# Patient Record
Sex: FEMALE | Race: WHITE | NOT HISPANIC OR LATINO | Employment: STUDENT | URBAN - METROPOLITAN AREA
[De-identification: names, ages, dates, MRNs, and addresses within clinical notes are randomized per-mention and may not be internally consistent; named-entity substitution may affect disease eponyms.]

---
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
## 2021-01-04 RX ORDER — ALBUTEROL SULFATE 90 UG/1
2 AEROSOL, METERED RESPIRATORY (INHALATION) EVERY 4 HOURS PRN
Qty: 3 INHALER | Refills: 1 | Status: SHIPPED | OUTPATIENT
Start: 2021-01-04 | End: 2021-10-13 | Stop reason: SDUPTHER

## 2021-01-04 RX ORDER — FLUTICASONE PROPIONATE 44 MCG
2 AEROSOL WITH ADAPTER (GRAM) INHALATION 2 TIMES DAILY
Qty: 3 INHALER | Refills: 1 | Status: SHIPPED | OUTPATIENT
Start: 2021-01-04 | End: 2021-10-13 | Stop reason: SDUPTHER

## 2021-01-05 DIAGNOSIS — E03.8 SUBCLINICAL HYPOTHYROIDISM: ICD-10-CM

## 2021-01-05 RX ORDER — LEVOTHYROXINE SODIUM 0.05 MG/1
50 TABLET ORAL
Qty: 90 TABLET | Refills: 1 | OUTPATIENT
Start: 2021-01-05

## 2021-01-08 LAB
ALBUMIN SERPL-MCNC: 3.9 G/DL (ref 3.6–5.1)
ALBUMIN/GLOB SERPL: 1.4 (CALC) (ref 1–2.5)
ALP SERPL-CCNC: 65 U/L (ref 36–128)
ALT SERPL-CCNC: 16 U/L (ref 5–32)
AST SERPL-CCNC: 15 U/L (ref 12–32)
BASOPHILS # BLD AUTO: 62 CELLS/UL (ref 0–200)
BASOPHILS NFR BLD AUTO: 0.7 %
BILIRUB SERPL-MCNC: 0.4 MG/DL (ref 0.2–1.1)
BUN SERPL-MCNC: 8 MG/DL (ref 7–20)
BUN/CREAT SERPL: NORMAL (CALC) (ref 6–22)
C TRACH RRNA SPEC QL NAA+PROBE: NOT DETECTED
CALCIUM SERPL-MCNC: 9.2 MG/DL (ref 8.9–10.4)
CHLORIDE SERPL-SCNC: 103 MMOL/L (ref 98–110)
CHOLEST SERPL-MCNC: 200 MG/DL
CHOLEST/HDLC SERPL: 3.6 (CALC)
CO2 SERPL-SCNC: 28 MMOL/L (ref 20–32)
CREAT SERPL-MCNC: 0.79 MG/DL (ref 0.5–1)
EOSINOPHIL # BLD AUTO: 123 CELLS/UL (ref 15–500)
EOSINOPHIL NFR BLD AUTO: 1.4 %
ERYTHROCYTE [DISTWIDTH] IN BLOOD BY AUTOMATED COUNT: 13 % (ref 11–15)
GLOBULIN SER CALC-MCNC: 2.8 G/DL (CALC) (ref 2–3.8)
GLUCOSE SERPL-MCNC: 92 MG/DL (ref 65–99)
HCT VFR BLD AUTO: 43.8 % (ref 35–45)
HDLC SERPL-MCNC: 55 MG/DL
HGB BLD-MCNC: 14.2 G/DL (ref 11.7–15.5)
HIV 1+2 AB+HIV1 P24 AG SERPL QL IA: NORMAL
LDLC SERPL CALC-MCNC: 118 MG/DL (CALC)
LYMPHOCYTES # BLD AUTO: 3494 CELLS/UL (ref 850–3900)
LYMPHOCYTES NFR BLD AUTO: 39.7 %
MCH RBC QN AUTO: 27 PG (ref 27–33)
MCHC RBC AUTO-ENTMCNC: 32.4 G/DL (ref 32–36)
MCV RBC AUTO: 83.4 FL (ref 80–100)
MONOCYTES # BLD AUTO: 519 CELLS/UL (ref 200–950)
MONOCYTES NFR BLD AUTO: 5.9 %
N GONORRHOEA RRNA SPEC QL NAA+PROBE: NOT DETECTED
NEUTROPHILS # BLD AUTO: 4602 CELLS/UL (ref 1500–7800)
NEUTROPHILS NFR BLD AUTO: 52.3 %
NONHDLC SERPL-MCNC: 145 MG/DL (CALC)
PLATELET # BLD AUTO: 318 THOUSAND/UL (ref 140–400)
PMV BLD REES-ECKER: 9.3 FL (ref 7.5–12.5)
POTASSIUM SERPL-SCNC: 4.1 MMOL/L (ref 3.8–5.1)
PROT SERPL-MCNC: 6.7 G/DL (ref 6.3–8.2)
RBC # BLD AUTO: 5.25 MILLION/UL (ref 3.8–5.1)
SL AMB EGFR AFRICAN AMERICAN: 126 ML/MIN/1.73M2
SL AMB EGFR NON AFRICAN AMERICAN: 109 ML/MIN/1.73M2
SODIUM SERPL-SCNC: 138 MMOL/L (ref 135–146)
T4 FREE SERPL-MCNC: 0.9 NG/DL (ref 0.8–1.4)
TRIGL SERPL-MCNC: 155 MG/DL
TSH SERPL-ACNC: 5.18 MIU/L
WBC # BLD AUTO: 8.8 THOUSAND/UL (ref 3.8–10.8)

## 2021-03-10 DIAGNOSIS — Z23 ENCOUNTER FOR IMMUNIZATION: ICD-10-CM

## 2021-03-11 ENCOUNTER — IMMUNIZATIONS (OUTPATIENT)
Dept: FAMILY MEDICINE CLINIC | Facility: HOSPITAL | Age: 21
End: 2021-03-11

## 2021-03-11 DIAGNOSIS — Z23 ENCOUNTER FOR IMMUNIZATION: Primary | ICD-10-CM

## 2021-03-11 PROCEDURE — 0001A SARS-COV-2 / COVID-19 MRNA VACCINE (PFIZER-BIONTECH) 30 MCG: CPT

## 2021-03-11 PROCEDURE — 91300 SARS-COV-2 / COVID-19 MRNA VACCINE (PFIZER-BIONTECH) 30 MCG: CPT

## 2021-03-22 ENCOUNTER — TELEMEDICINE (OUTPATIENT)
Dept: FAMILY MEDICINE CLINIC | Facility: CLINIC | Age: 21
End: 2021-03-22
Payer: COMMERCIAL

## 2021-03-22 VITALS — WEIGHT: 293 LBS | TEMPERATURE: 98.3 F | BODY MASS INDEX: 42.12 KG/M2

## 2021-03-22 DIAGNOSIS — F41.1 GENERALIZED ANXIETY DISORDER: ICD-10-CM

## 2021-03-22 DIAGNOSIS — F32.A DEPRESSION, UNSPECIFIED DEPRESSION TYPE: ICD-10-CM

## 2021-03-22 DIAGNOSIS — E03.9 ACQUIRED HYPOTHYROIDISM: Primary | ICD-10-CM

## 2021-03-22 PROBLEM — F43.10 PTSD (POST-TRAUMATIC STRESS DISORDER): Status: RESOLVED | Noted: 2020-11-15 | Resolved: 2021-03-22

## 2021-03-22 PROCEDURE — 3725F SCREEN DEPRESSION PERFORMED: CPT | Performed by: FAMILY MEDICINE

## 2021-03-22 PROCEDURE — 1036F TOBACCO NON-USER: CPT | Performed by: FAMILY MEDICINE

## 2021-03-22 PROCEDURE — 99214 OFFICE O/P EST MOD 30 MIN: CPT | Performed by: FAMILY MEDICINE

## 2021-03-22 PROCEDURE — 3008F BODY MASS INDEX DOCD: CPT | Performed by: FAMILY MEDICINE

## 2021-03-22 RX ORDER — LEVOTHYROXINE SODIUM 50 UG/1
50 TABLET ORAL DAILY
COMMUNITY
End: 2021-09-24 | Stop reason: DRUGHIGH

## 2021-03-22 RX ORDER — LEVOTHYROXINE SODIUM 0.05 MG/1
50 TABLET ORAL DAILY
COMMUNITY
Start: 2021-01-13 | End: 2021-03-22 | Stop reason: ALTCHOICE

## 2021-03-22 RX ORDER — FLUOXETINE HYDROCHLORIDE 40 MG/1
40 CAPSULE ORAL DAILY
Qty: 30 CAPSULE | Refills: 5 | Status: SHIPPED | OUTPATIENT
Start: 2021-03-22

## 2021-03-24 PROBLEM — E03.9 ACQUIRED HYPOTHYROIDISM: Status: ACTIVE | Noted: 2019-08-22

## 2021-03-24 PROBLEM — F32.A DEPRESSION: Status: ACTIVE | Noted: 2020-11-15

## 2021-03-24 NOTE — PROGRESS NOTES
Virtual Regular Visit      Assessment/Plan:    Problem List Items Addressed This Visit        Endocrine    Acquired hypothyroidism - Primary       She will continue on the current dose of her Levoxyl  She will follow-up with endocrinology as scheduled  Relevant Medications    Levoxyl 50 MCG tablet       Other    Depression       The patient is still having some breakthrough depression symptoms  We will increase the fluoxetine as ordered  We will continue to monitor closely  Relevant Medications    FLUoxetine (PROzac) 40 MG capsule    Generalized anxiety disorder       The patient's anxiety symptoms are improved with the fluoxetine but she feels it could be a little bit stronger  She is still having some breakthrough symptoms  We will increase the fluoxetine to 40 mg daily as ordered and continue to monitor her closely  Will have follow-up with her in 1 month for a virtual visit  Relevant Medications    FLUoxetine (PROzac) 40 MG capsule          BMI Counseling: Body mass index is 42 12 kg/m²  The BMI is above normal  Nutrition recommendations include decreasing portion sizes, encouraging healthy choices of fruits and vegetables, decreasing fast food intake, consuming healthier snacks, limiting drinks that contain sugar, moderation in carbohydrate intake, increasing intake of lean protein, reducing intake of saturated and trans fat and reducing intake of cholesterol  Exercise recommendations include exercising 3-5 times per week and strength training exercises  No pharmacotherapy was ordered  Patient referred to PCP due to patient being overweight             Reason for visit is   Chief Complaint   Patient presents with    Follow-up     Follow up Thyroid    Virtual Regular Visit        Encounter provider Tere Capellan DO    Provider located at [address] [address] [phone]      Recent Visits  Date Type Provider Dept   03/22/21 Telemedicine DO [facility]   Showing recent visits within past 7 days and meeting all other requirements     Future Appointments  No visits were found meeting these conditions  Showing future appointments within next 150 days and meeting all other requirements        The patient was identified by name and date of birth  Maddie Laird was informed that this is a telemedicine visit and that the visit is being conducted through [facility] and patient was informed that this is a secure, HIPAA-compliant platform  She agrees to proceed     My office door was closed  No one else was in the room  She acknowledged consent and understanding of privacy and security of the video platform  The patient has agreed to participate and understands they can discontinue the visit at any time  Patient is aware this is a billable service  Tanis Koyanagi is a 24 y o  female who presents for a follow up of her anxiety   Maddie Laird is a 24 y o  female  Who presents today for follow-up of her anxiety and hypothyroidism  She is currently seeing an endocrinologist at [facility]- Dr Opal Martinez  She was unable to tolerate levothyroxine due to her gluten and lactose sensitivity  We switched her to Levoxyl and she is doing well on this since it is gluten free  She states the fluoxetine has been helping with her anxiety but she is still feeling depressed and is sleeping more  She has decreased motivation and feeling blah  She feels very danielle as well  She denies any severe panic attacks  She has been stress eating more  She denies any suicidal ideations  She is interested in increasing the dose of her medication  Anxiety  Presents for follow-up visit  Symptoms include depressed mood, excessive worry and nervous/anxious behavior   Patient reports no chest pain, compulsions, confusion, decreased concentration, dizziness, dry mouth, feeling of choking, hyperventilation, impotence, insomnia, irritability, malaise, muscle tension, nausea, obsessions, palpitations, panic, restlessness, shortness of breath or suicidal ideas  Past Medical History:   Diagnosis Date    Asthma        Past Surgical History:   Procedure Laterality Date    INSERTION OF INTRAUTERINE DEVICE (IUD)      WISDOM TOOTH EXTRACTION      4       Current Outpatient Medications   Medication Sig Dispense Refill    albuterol (ProAir HFA) 90 mcg/act inhaler Inhale 2 puffs every 4 (four) hours as needed for wheezing 3 Inhaler 1    FLUoxetine (PROzac) 40 MG capsule Take 1 capsule (40 mg total) by mouth daily 30 capsule 5    fluticasone (Flovent HFA) 44 mcg/act inhaler Inhale 2 puffs 2 (two) times a day 3 Inhaler 1    KYLEENA 19 5 MG intrauterine device       Levoxyl 50 MCG tablet Take 50 mcg by mouth daily       No current facility-administered medications for this visit  Allergies   Allergen Reactions    Gluten Meal Diarrhea, Fatigue, GI Intolerance and Vomiting    Lactose Diarrhea and GI Intolerance    Lactose Intolerance (Gi) Diarrhea and GI Intolerance       Review of Systems   Constitutional: Negative  Negative for irritability  HENT: Negative  Eyes: Negative  Respiratory: Negative  Negative for shortness of breath  Cardiovascular: Negative  Negative for chest pain and palpitations  Gastrointestinal: Negative  Negative for nausea  Endocrine: Negative  Genitourinary: Negative  Negative for impotence  Musculoskeletal: Negative  Skin: Negative  Allergic/Immunologic: Negative  Neurological: Negative  Negative for dizziness  Hematological: Negative  Psychiatric/Behavioral: Negative for confusion, decreased concentration and suicidal ideas  The patient is nervous/anxious  The patient does not have insomnia          Video Exam    Vitals:    03/22/21 1522   Temp: 98 3 °F (36 8 °C)   TempSrc: Temporal   Weight: (!) 137 kg (302 lb)   The above vitals were obtained by the patient at home and reported to our office since this is a virtual visit  Physical Exam  Constitutional:       General: She is not in acute distress  Appearance: She is well-developed  She is not diaphoretic  HENT:      Head: Normocephalic and atraumatic  Eyes:      Pupils: Pupils are equal, round, and reactive to light  Neck:      Musculoskeletal: Normal range of motion and neck supple  Pulmonary:      Effort: Pulmonary effort is normal       Breath sounds: Normal breath sounds  Neurological:      Mental Status: She is alert and oriented to person, place, and time  Psychiatric:         Behavior: Behavior normal          Thought Content: Thought content normal          Judgment: Judgment normal           I spent 15 minutes directly with the patient during this visit      VIRTUAL VISIT Amos Sebastian acknowledges that she has consented to an online visit or consultation  She understands that the online visit is based solely on information provided by her, and that, in the absence of a face-to-face physical evaluation by the physician, the diagnosis she receives is both limited and provisional in terms of accuracy and completeness  This is not intended to replace a full medical face-to-face evaluation by the physician  Dawood Akins understands and accepts these terms

## 2021-03-24 NOTE — ASSESSMENT & PLAN NOTE
The patient is still having some breakthrough depression symptoms  We will increase the fluoxetine as ordered  We will continue to monitor closely

## 2021-03-24 NOTE — ASSESSMENT & PLAN NOTE
She will continue on the current dose of her Levoxyl  She will follow-up with endocrinology as scheduled

## 2021-03-24 NOTE — ASSESSMENT & PLAN NOTE
The patient's anxiety symptoms are improved with the fluoxetine but she feels it could be a little bit stronger  She is still having some breakthrough symptoms  We will increase the fluoxetine to 40 mg daily as ordered and continue to monitor her closely  Will have follow-up with her in 1 month for a virtual visit

## 2021-03-31 ENCOUNTER — IMMUNIZATIONS (OUTPATIENT)
Dept: FAMILY MEDICINE CLINIC | Facility: HOSPITAL | Age: 21
End: 2021-03-31

## 2021-03-31 DIAGNOSIS — Z23 ENCOUNTER FOR IMMUNIZATION: Primary | ICD-10-CM

## 2021-03-31 PROCEDURE — 0002A SARS-COV-2 / COVID-19 MRNA VACCINE (PFIZER-BIONTECH) 30 MCG: CPT

## 2021-03-31 PROCEDURE — 91300 SARS-COV-2 / COVID-19 MRNA VACCINE (PFIZER-BIONTECH) 30 MCG: CPT

## 2021-09-21 RX ORDER — AMOXICILLIN 500 MG/1
500 CAPSULE ORAL 3 TIMES DAILY
COMMUNITY
Start: 2021-09-19 | End: 2021-09-26

## 2021-09-24 ENCOUNTER — OFFICE VISIT (OUTPATIENT)
Dept: FAMILY MEDICINE CLINIC | Facility: CLINIC | Age: 21
End: 2021-09-24
Payer: COMMERCIAL

## 2021-09-24 VITALS
WEIGHT: 293 LBS | BODY MASS INDEX: 41.02 KG/M2 | SYSTOLIC BLOOD PRESSURE: 122 MMHG | TEMPERATURE: 97.8 F | OXYGEN SATURATION: 98 % | HEIGHT: 71 IN | HEART RATE: 95 BPM | DIASTOLIC BLOOD PRESSURE: 74 MMHG | RESPIRATION RATE: 20 BRPM

## 2021-09-24 DIAGNOSIS — F42.9 OBSESSIVE-COMPULSIVE DISORDER, UNSPECIFIED TYPE: Primary | ICD-10-CM

## 2021-09-24 PROCEDURE — 99213 OFFICE O/P EST LOW 20 MIN: CPT | Performed by: NURSE PRACTITIONER

## 2021-09-24 PROCEDURE — 1036F TOBACCO NON-USER: CPT | Performed by: NURSE PRACTITIONER

## 2021-09-24 PROCEDURE — 3725F SCREEN DEPRESSION PERFORMED: CPT | Performed by: NURSE PRACTITIONER

## 2021-09-24 PROCEDURE — 3008F BODY MASS INDEX DOCD: CPT | Performed by: NURSE PRACTITIONER

## 2021-09-24 RX ORDER — LEVOTHYROXINE SODIUM 75 UG/1
75 TABLET ORAL DAILY
COMMUNITY
Start: 2021-08-19

## 2021-09-24 NOTE — PROGRESS NOTES
Assessment/Plan   Problem List Items Addressed This Visit     None      Visit Diagnoses     Obsessive-compulsive disorder, unspecified type    -  Primary                Chief Complaint   Patient presents with    Discuss medications     Pt currently on Prozac 40mg for OCD  Pt would like to discuss mood disorders and changing medication  Pt sees a psychiatrist at [facility]   Patient ID: George Archer is a 24 y o  female  Vitals:    09/24/21 1641   BP: 122/74   Pulse: 95   Resp: 20   Temp: 97 8 °F (36 6 °C)   SpO2: 98%     Here today requesting additional medications for her OCD, she reports that Prozac 40 mg is helping "about 50%" but has been doing research on OCD and would like to add Topamax to her Prozac  Patient reports she sees a psychiatrist at school and is in therapy  Discussed possibly changing prozac to zoloft which is effective for OCD  Patient defers this medication at this time  Encouraged patient to discuss her medication changes, with her psychiatrist or her PCP    Appointment scheduled with DR Wen Dean and she will spaek with her therapist on return to school on Monday       The following portions of the patient's history were reviewed and updated as appropriate: allergies, current medications, past family history, past medical history, past social history, past surgical history and problem list     Review of Systems   Constitutional: Negative  HENT: Negative  Eyes: Negative  Respiratory: Negative  Cardiovascular: Negative  Gastrointestinal: Negative  Endocrine: Negative  Genitourinary: Negative  Musculoskeletal: Negative  Skin: Negative  Allergic/Immunologic: Negative  Neurological: Negative  Hematological: Negative  Psychiatric/Behavioral: Positive for decreased concentration  Negative for sleep disturbance and suicidal ideas  The patient is nervous/anxious          Objective     Physical Exam  Vitals and nursing note reviewed  Constitutional:       Appearance: She is obese  She is not ill-appearing  HENT:      Head: Normocephalic and atraumatic  Nose: Nose normal  No congestion  Eyes:      General:         Right eye: No discharge  Left eye: No discharge  Extraocular Movements: Extraocular movements intact  Conjunctiva/sclera: Conjunctivae normal    Cardiovascular:      Rate and Rhythm: Normal rate and regular rhythm  Pulses: Normal pulses  Heart sounds: Normal heart sounds  No murmur heard  Pulmonary:      Effort: Pulmonary effort is normal  No respiratory distress  Breath sounds: Normal breath sounds  Musculoskeletal:         General: Normal range of motion  Cervical back: Normal range of motion  Skin:     General: Skin is warm and dry  Neurological:      Mental Status: She is alert and oriented to person, place, and time  Psychiatric:         Attention and Perception: Attention normal          Mood and Affect: Mood is anxious  Affect is flat  Speech: Speech normal          Behavior: Behavior normal  Behavior is cooperative  Thought Content:  Thought content normal          Cognition and Memory: Cognition and memory normal          Judgment: Judgment normal

## 2021-10-13 DIAGNOSIS — J45.20 MILD INTERMITTENT ASTHMA WITHOUT COMPLICATION: ICD-10-CM

## 2021-10-13 RX ORDER — ALBUTEROL SULFATE 90 UG/1
2 AEROSOL, METERED RESPIRATORY (INHALATION) EVERY 4 HOURS PRN
Qty: 18 G | Refills: 1 | Status: SHIPPED | OUTPATIENT
Start: 2021-10-13 | End: 2021-12-23 | Stop reason: SDUPTHER

## 2021-10-13 RX ORDER — FLUTICASONE PROPIONATE 44 MCG
2 AEROSOL WITH ADAPTER (GRAM) INHALATION 2 TIMES DAILY
Qty: 10.6 G | Refills: 3 | Status: SHIPPED | OUTPATIENT
Start: 2021-10-13

## 2021-12-23 DIAGNOSIS — J45.20 MILD INTERMITTENT ASTHMA WITHOUT COMPLICATION: ICD-10-CM

## 2021-12-23 RX ORDER — ALBUTEROL SULFATE 90 UG/1
2 AEROSOL, METERED RESPIRATORY (INHALATION) EVERY 4 HOURS PRN
Qty: 18 G | Refills: 1 | Status: SHIPPED | OUTPATIENT
Start: 2021-12-23
